# Patient Record
Sex: FEMALE | Race: WHITE | NOT HISPANIC OR LATINO | Employment: PART TIME | URBAN - NONMETROPOLITAN AREA
[De-identification: names, ages, dates, MRNs, and addresses within clinical notes are randomized per-mention and may not be internally consistent; named-entity substitution may affect disease eponyms.]

---

## 2024-08-27 ENCOUNTER — OFFICE VISIT (OUTPATIENT)
Dept: BEHAVIORAL HEALTH | Facility: CLINIC | Age: 24
End: 2024-08-27
Payer: COMMERCIAL

## 2024-08-27 VITALS
HEART RATE: 60 BPM | WEIGHT: 173.2 LBS | SYSTOLIC BLOOD PRESSURE: 100 MMHG | HEIGHT: 69 IN | OXYGEN SATURATION: 98 % | DIASTOLIC BLOOD PRESSURE: 60 MMHG | BODY MASS INDEX: 25.65 KG/M2

## 2024-08-27 DIAGNOSIS — F33.1 MODERATE EPISODE OF RECURRENT MAJOR DEPRESSIVE DISORDER: Primary | ICD-10-CM

## 2024-08-27 DIAGNOSIS — R45.851 PASSIVE SUICIDAL IDEATIONS: ICD-10-CM

## 2024-08-27 DIAGNOSIS — F41.1 GENERALIZED ANXIETY DISORDER: ICD-10-CM

## 2024-08-27 DIAGNOSIS — Z53.20 MEDICATION THERAPY MANAGEMENT RECOMMENDATION DECLINED BY PATIENT: ICD-10-CM

## 2024-08-27 DIAGNOSIS — Z13.39 ADHD (ATTENTION DEFICIT HYPERACTIVITY DISORDER) EVALUATION: ICD-10-CM

## 2024-08-27 PROCEDURE — 90792 PSYCH DIAG EVAL W/MED SRVCS: CPT | Performed by: NURSE PRACTITIONER

## 2024-08-27 RX ORDER — DESVENLAFAXINE 25 MG/1
25 TABLET, EXTENDED RELEASE ORAL DAILY
COMMUNITY
Start: 2024-07-24

## 2024-08-27 RX ORDER — ARIPIPRAZOLE 2 MG/1
2 TABLET ORAL DAILY
COMMUNITY
Start: 2024-07-31

## 2024-08-27 RX ORDER — ATOMOXETINE 80 MG/1
80 CAPSULE ORAL DAILY
COMMUNITY

## 2024-08-27 NOTE — PROGRESS NOTES
"Subjective   Eliza Keen is a 23 y.o. female who presents today for initial evaluation     Referring Provider:  No referring provider defined for this encounter.    Chief Complaint:  ADHD evaluation, anxiety, depression, passive SI, medication therapy recommendation declined    Provider introduced self, as well as credentials, and informed of provider role which will primarily include medication management of mental health conditions. Explained the difference between provider role vs. therapy/counseling services which include CBT (talk therapy) and do not include management of medications which are typically 45 minutes to 1 hr in length, however, if patient was in agreement to start therapy this provider can provide a contact list and/or refer. Patient verbalized understanding and agreed to proceed.    History of Present Illness:  Patient presents today in office with a history of anxiety, depression for which treatment began in 11th grade of highschool.  Patient is currently prescribed Abilify 2 mg in the morning (5/30/24), Pristiq 25 mg in the morning (restarted 3/21/24), Strattera 80 mg in morning started in 2022 which have provided effectiveness, \"I guess it helps, its stabilized my mood more\".  Patient denies significant PMHX.    Patient goal of treatment \"be able to get stuff done.\"    In regards to PHQ9 screening question of thoughts of being better off dead or hurting herself in some way, patient reports thinking of ways to harm self, \"such as driving, I don't know\".  Patient admits to feeling overwhelmed for the last couple of years.  School full time, working, social interactions have became overwhelming.  Patient prefers to not reach out to family and friends.  Most important thing worth living for is \"my future, I want to big a biological tech at [employer].\"    In regards to patient reporting to MA of having a diagnosis of Bipolar and ADHD, \"I just figured I did because I am on these " "medications. I haven't been diagnosed though.\"      ADHD:   Elementary school:   Grades: good   Special classes or failures: No and No  Got in trouble:no  Referral for ADHD testing: no   Fhx:no  Presently:  Problems with attention to detail:  Sometimes(frequently submits incomplete work, goes back to fix mistakes, overlooks or misses details, work is inaccurate)   Problems with sustained attention: Yes (Difficulty remaining focused during lectures) occasionally in conversations, lengthy reading)   Problems listening when spoken to directly: \"a little bit\" (mind seems elsewhere, even in the absences of any obvious distraction)   Failure to finish tasks: Yes at home, will start cleaning room or an assignment and then stops, difficulty completing lengthy tasks; At work denies difficulty  Avoids/Dislikes/Reluctant to engage in tasks that require sustained mental effort: Yes with schoolwork, homework, preparing lengthy papers; Not at work  Easily distracted: Sometimes (unrelated thoughts, external factors) at times during school lectures mind wanders off, zones back in to lecture then feels lost  Forgetting things: Sometimes (returning calls, responding to text messages, doesn't have any own bills) assignments, meetings, bad about getting something scheduled, then once scheduled is okay.  Losing things: Yes (school materials, wallets, keys, cell phone) denies having a designated area for important items :\"I probably should\"  Hard to organize: Sometimes (difficulty managing sequential tasks, keeping materials and belongings in order, messy, disorganized work, poor time management, fails to meet deadlines)   Talks a lot and cutting people off: Sometimes  Drifts off during conversations: No  Difficulty with Reading: Yes difficulty with lengthy reading, difficult starting   Xiii. Difficulty watching TV/Movies: doesn't watch TV/Movies, will watch [employer] [place] on phone, will get bored and switches from various apps     Depression: " Patient complains of depression. She complains of anhedonia, depressed mood, difficulty concentrating, fatigue, feelings of worthlessness/guilt, and suicidal thoughts without plan     Anxiety:  The patient endorses to the following symptoms of anxiety including: excessive anxiety and worry about a number of events or activities for more days than not, restlessness or feeling keyed up, being easily fatigued, difficulty concentrating or mind going blank, and irritability which have caused impairment in important areas of daily functioning.         C-SSRS (Screen-Recent) Past Month  Q1 Wished to be Dead (Past Month): yes (08/27/24 0903)  Q2 Suicidal Thoughts (Past Month): no (08/27/24 0903)  Q6 Suicide Behavior (Lifetime): no (08/27/24 0903)  Level of Risk per Screen: low risk (08/27/24 0903)         PHQ-9 Depression Screening  PHQ-9 Total Score: 9    Little interest or pleasure in doing things? 1-->several days   Feeling down, depressed, or hopeless? 1-->several days   Trouble falling or staying asleep, or sleeping too much? 0-->not at all   Feeling tired or having little energy? 2-->more than half the days   Poor appetite or overeating? 0-->not at all   Feeling bad about yourself - or that you are a failure or have let yourself or your family down? 1-->several days   Trouble concentrating on things, such as reading the newspaper or watching television? 3-->nearly every day   Moving or speaking so slowly that other people could have noticed? Or the opposite - being so fidgety or restless that you have been moving around a lot more than usual? 0-->not at all   Thoughts that you would be better off dead, or of hurting yourself in some way? 1-->several days   PHQ-9 Total Score 9     DEVON-7  Feeling nervous, anxious or on edge: Several days  Not being able to stop or control worrying: More than half the days  Worrying too much about different things: More than half the days  Trouble Relaxing: Several days  Being so  restless that it is hard to sit still: Several days  Feeling afraid as if something awful might happen: More than half the days  Becoming easily annoyed or irritable: Several days  DEVON 7 Total Score: 10  If you checked any problems, how difficult have these problems made it for you to do your work, take care of things at home, or get along with other people: Somewhat difficult    The following portions of the patient's history were reviewed and updated as appropriate: allergies, current medications, past family history, past medical history, past social history, and past surgical history.     Past Surgical History:  Past Surgical History:   Procedure Laterality Date    TUMOR REMOVAL Right     Salivary Gland       Problem List:  There is no problem list on file for this patient.      Allergy:   No Known Allergies     Discontinued Medications:  There are no discontinued medications.    Current Medications:   Current Outpatient Medications   Medication Sig Dispense Refill    ARIPiprazole (ABILIFY) 2 MG tablet Take 1 tablet by mouth Daily.      atomoxetine (STRATTERA) 80 MG capsule Take 1 capsule by mouth Daily.      Desvenlafaxine Succinate ER 25 MG tablet sustained-release 24 hour Take 1 tablet by mouth Daily.       No current facility-administered medications for this visit.       Past Medical History:  Past Medical History:   Diagnosis Date    Anxiety     Depression        Past Psychiatric History:  Began Treatment: 11 th grade  Diagnoses:Depression and Anxiety  [facility]   Therapist: one through [facility] and other was outside facility unable to recall name  Admission History:Denies  Medication Trials: Celexa 9/2023 ended 3/21/24 no longer effective, sweating, frequent mood swings; Buspar- ; Prozac- ; Paxil- ; Zoloft- first medication, stopped due to feeling better ; Trintellix-tried for 2 weeks samples then insurance would not cover ; Vraylar-sample, insurance would  not  "cover with Pristiq  \"either it wasn't working, then switched or felt no longer needed; more recently due to it wasn't working or I didn't like it.\"  Self Harm: Denies  Suicide Attempts:Denies   Psychosis, Anxiety, Depression:  n/a    Substance Abuse History: As of 26  Types:Denies all, including illicit  Withdrawal Symptoms:Denies  Longest Period Sober:Not Applicable   AA: Not applicable   Legal: n/a    Social History: As of 26  Martial Status:Single  Employed:Yes and If so, where [employer] in [place]  Kids:No  House:Lives in a house with friends   History: Denies  Access to Guns:  no    Social History     Socioeconomic History    Marital status: Single    Number of children: 0    Highest education level: Some college, no degree   Tobacco Use    Smoking status: Every Day     Current packs/day: 0.25     Types: Cigarettes    Smokeless tobacco: Never   Vaping Use    Vaping status: Never Used   Substance and Sexual Activity    Alcohol use: Yes     Comment: weekly    Drug use: Never    Sexual activity: Yes     Partners: Male     Birth control/protection: I.U.D.       Family History:   Suicide Attempts: Denies  Suicide Completions:Denies      Family History   Problem Relation Age of Onset    OCD Brother     Autism spectrum disorder Brother     ADD / ADHD Neg Hx     Suicide Attempts Neg Hx     Depression Neg Hx     Anxiety disorder Neg Hx     Drug abuse Neg Hx     Alcohol abuse Neg Hx        Developmental History:   Born: [place]  Siblings:2 older brothers  Childhood: Denies Abuse  High School:Completed  College: currently at [employer] studying environmental science senior year     Mental Status Exam:   Hygiene:   good  Cooperation:  Evasive  Eye Contact:  Poor  Psychomotor Behavior:  Appropriate  Affect:  Restricted flat  Mood: depressed and anxious  Speech:  Minimal  Thought Process:  Goal directed  Thought Content:  Mood congruent  Suicidal:   passive SI, denied " "rumination or plan    Homicidal:  None  Hallucinations:  None  Delusion:  None  Memory:  Intact  Orientation:  Grossly intact  Reliability:  good  Insight:  Good  Judgement:  Good  Impulse Control:  Good  Physical/Medical Issues:  No      Review of Systems:  Review of Systems      Physical Exam:  Physical Exam    Vital Signs:   /60   Pulse 60   Ht 175.3 cm (69\")   Wt 78.6 kg (173 lb 3.2 oz)   SpO2 98%   BMI 25.58 kg/m²      Lab Results:   No visits with results within 6 Month(s) from this visit.   Latest known visit with results is:   No results found for any previous visit.       EKG Results:  No orders to display       Imaging Results:  No Images in the past 120 days found..      Assessment & Plan   Diagnoses and all orders for this visit:    1. Moderate episode of recurrent major depressive disorder (Primary)    2. Generalized anxiety disorder    3. ADHD (attention deficit hyperactivity disorder) evaluation    4. Passive suicidal ideations    5. Medication therapy management recommendation declined by patient        Visit Diagnoses:    ICD-10-CM ICD-9-CM   1. Moderate episode of recurrent major depressive disorder  F33.1 296.32   2. Generalized anxiety disorder  F41.1 300.02   3. ADHD (attention deficit hyperactivity disorder) evaluation  Z13.39 V79.8   4. Passive suicidal ideations  R45.851 V62.84   5. Medication therapy management recommendation declined by patient  Z53.20 V64.2       PLAN:  Safety: No acute safety concerns  Therapy: Declines   Patient educated and encouraged to start therapy to develop new coping skills and more adaptive ways of thinking about problems. These tools can help make positive changes. The benefits of counseling often last long after treatment sessions have stopped.  Risk Assessment: Risk of self-harm acutely is moderate.  Risk factors include anxiety disorder and mood disorder.  Protective factors include no family history, denies access to guns/weapons, no present SI, " no history of suicide attempts or self-harm in the past, minimal AODA, healthcare seeking, future orientation, willingness to engage in care.  Risk of self-harm chronically is also moderate, but could be further elevated in the event of treatment noncompliance and/or AODA.  Meds:  No changes  Education provided regarding current medications: Abilify, Pristiq, and Strattera.  Patient wishes to remain on current medications and doses and will follow up with PCP.      -Abilify (Aripiprazole)  Discussed all risks, benefits, alternatives, and side effects of Aripiprazole, including but not limited to GI upset, headache, activating/sedating effects, weight gain (though less frequent and severe than other antipsychotics), dyslipidemia, extrapyramidal symptoms (dystonia, drug-induced parkinsonism, akathisia, tardive dyskinesia), lowering of seizure threshold, hematologic abnormalities, hyperglycemia, impulse control disorders, increased mortality in elderly patients with dementia-related psychosis, neuroleptic malignant syndrome, sexual dysfunction, orthostatic hypotension, (may enhance the effects of antihypertensive medications) falls risk in older adults, and temperature dysregulation. No adverse effects of antipsychotic medications noted, such as EPS (Extrapyramidal side effects and TD (Tardive Dyskinesia) noted.     -Desvenlafaxine (Pristiq) Discussed all risks, benefits, alternatives, and side effects of Pristiq including but not limited to GI symptoms (N/V/D, constipation), sexual dysfunction, dizziness, insomnia, sweating, hyperhidrosis, anxiety, bleeding risk, seizure risk, CNS depression, dyslipidemia, activation of kamila or hypomania, increased fragility fracture risk, hyponatremia, ocular effects, HTN, withdrawal syndrome following abrupt discontinuation, serotonin syndrome, and activation of suicidal ideation and behavior.  -Strattera  Risks, benefits, alternatives discussed with patient including nausea, GI  upset, sedation, exacerbation of irritability, dry mouth, constipation, urinary hesitancy, elevated heart rate and blood pressure.      Labs: n/a Advised patient to have baseline routine labs collected per PCP due to current medications, as there are no visible lab work in EHR and patient denied recent lab work.     Patient presentation seems most consistent with anxiety and depression.  Passive SI were voiced though patient was adamant of no plans or rumination. Patient demeanor is flat, evasive, minimal engagement during visit, although patient voiced effectiveness with current medication regimen, and did not wish to change any medications today, I have recommended that she have baseline labs done due to risks with current medications on kidneys, cholesterol, liver, and long term effects. Although patient denies side effects, it is uncertain if her affect is related to Abilify or if this is her norm, vs not being forthcoming about feelings related to underlying anxiety and depression.  Patient does not meet criteria for ADHD which was discussed as well today.     The plan was discussed with the patient. The patient was given time to ask questions and these questions were answered. At the conclusion of their visit they had no additional questions or concerns and all questions were answered to their satisfaction.   Patient was given instructions and counseling regarding condition and for health maintenance advice. Please see specific information pulled into the AVS if appropriate.   Patient to contact provider if symptoms worsen or fail to improve or if she wishes to discuss medication changes/options in the future. Will see patient as needed.        Patient screened positive for depression based on a PHQ-9 score of 9 on 8/27/2024. Follow-up recommendations include: PCP managing depression and Suicide Risk Assessment performed.       TREATMENT PLAN/GOALS: Continue supportive psychotherapy efforts and medications as  indicated. Treatment and medication options discussed during today's visit. Patient acknowledged and verbally consented to continue with current treatment plan and was educated on the importance of compliance with treatment and follow-up appointments.    MEDICATION ISSUES:  RAJESH reviewed as expected.  Discussed medication options and treatment plan of prescribed medication as well as the risks, benefits, and side effects including potential falls, possible impaired driving and metabolic adversities among others. Patient is agreeable to call the office with any worsening of symptoms or onset of side effects. Patient is agreeable to call 911 or go to the nearest ER should he/she begin having SI/HI. No medication side effects or related complaints today.     MEDS ORDERED DURING VISIT:  No orders of the defined types were placed in this encounter.      Return if symptoms worsen or fail to improve.         I spent 68 minutes caring for Eliza on this date of service. This time includes time spent by me in the following activities: preparing for the visit, obtaining and/or reviewing a separately obtained history, performing a medically appropriate examination and/or evaluation, counseling and educating the patient/family/caregiver, referring and communicating with other health care professionals, documenting information in the medical record, and care coordination.      This document has been electronically signed by SANDRA Miles  August 27, 2024 13:16 EDT           Part of this note may be an electronic transcription/translation of spoken language to printed text using the Dragon Dictation System.

## 2024-08-27 NOTE — PATIENT INSTRUCTIONS
"1. Should you want to get in touch with your provider, SANDRA Miles, please contact MY Medical Assistant, Lenore, directly at [phone].  Recommend saving Lenore's direct number in phone as this is the PREFERRED & EASIEST way to get in contact with your provider.  Please leave a voice mail if you do not get an answer and she will return your call within 24 hrs. You will NOT be able to contact provider on [employer], as Behavioral Health Providers are restricted. YOU MUST CALL [phone]  If you need to speak with the on call provider after hours or on weekends, please Contact the [facility] ([phone]) and staff will be able to page the provider on call directly.     2.  In the event you need to cancel an appointment, please notify the office at least 24 hrs prior:   Contact **Lenore Medical Assistant at [facility] directly at [phone] or the [facility] ([phone])     3. MEDICATION REFILLS:  PLEASE CALL THE PHARMACY TO REQUEST ALL MEDICATION REFILLS or via IMVU TO ENSURE YOU ARE RECEIVING YOUR MEDICATIONS IN A TIMELY MANNER. The pharmacy or [employer] madie will send this request ELECTRONICALLY to the ordering provider.   IF YOU USE AN AUTOMATED SERVICE AT THE PHARMACY FOR REFILLS AND ARE TOLD THERE ARE \"NO REFILLS REMAINING\"   PLEASE CALL THE PHARMACY & SPEAK TO A LIVE PERSON TO VERIFY IT IS THE MOST UP TO DATE PRESCRIPTION ON FILE.    All new prescriptions will have a different number, therefore, if you were given refills for a medication today or at last visit it will not have the same number as the previous prescription.     Going forward any medications for your mental health including any previously prescribed by your primary care provider will now be managed by SANDRA Miles. Contact provider's MA INITIALLY when down to 5-7 days remaining to ensure new refill(s) will have this provider name on prescription for future refills.  If requested from current bottle, via " "[employer], or via pharmacy the request would be directed to that ordering provider. And to prevent confusion, inaccurate dosing, inaccurate medication refills, the management of psychotropic and/or mental health medications should only be ordered by this mental health provider.    4.  In the event you have personal crisis, contact the following crisis numbers: Suicide Prevention Hotline 1-125.118.7580 or *988, SUNDAY Helpline 3-361-353-SUNDAY; [facility] Emergency Room [phone]; text HELLO to 634161; or 911.  If you feel like harming yourself or others, call 911 right away.  You can call the [facility] Suicide and Crisis Lifeline at  988   to speak with a counselor at the lifeline, or you can connect with one using their online chat  .    5.  Never stop an antidepressant medicine without first talking to a healthcare provider. Suddenly stopping this type of medication can cause withdrawal symptoms.    6.  Counseling and talk therapy  Counseling or therapy teaches you new coping skills and more adaptive ways of thinking about problems. These tools can help you make positive changes. The benefits of counseling often last long after treatment sessions have stopped.    7.   We would appreciate your feedback, please scan the QRS code on the back of your appointment card (or see below) and complete a brief survey.  [place] location is still not available, so please click \"[place]\" location.  Thank you      SPECIFIC RECOMMENDATIONS:     1.      Medications discussed at this encounter:                   -  no changes, advise to have baseline lab work with primary care provider.    2.      Psychotherapy recommendations:  Declined     3.     Return to clinic: as needed      Please arrive at least 15 minutes before your scheduled appointment time to complete check in process.      IF you are scheduled for a [facility] VIDEO visit, YOU MUST COMPLETE THE \"E-CHECK IN\" PROCESS PRIOR TO BEGINNING THE VISIT, You may still " complete the E-Check in for in office visits prior to appointment, you will receive multiple text/email reminders which will direct you further if needed.

## 2024-11-04 RX ORDER — ATOMOXETINE 80 MG/1
80 CAPSULE ORAL DAILY
OUTPATIENT
Start: 2024-11-04